# Patient Record
Sex: MALE | Race: WHITE | Employment: OTHER | URBAN - METROPOLITAN AREA
[De-identification: names, ages, dates, MRNs, and addresses within clinical notes are randomized per-mention and may not be internally consistent; named-entity substitution may affect disease eponyms.]

---

## 2018-06-12 PROBLEM — R39.12 WEAK URINE STREAM: Status: ACTIVE | Noted: 2018-06-12

## 2019-10-23 ENCOUNTER — ANESTHESIA EVENT (OUTPATIENT)
Dept: SURGERY | Age: 74
End: 2019-10-23
Payer: MEDICARE

## 2019-10-23 NOTE — PERIOP NOTES
PAT - SURGICAL PRE-ADMISSION INSTRUCTIONS    NAME:  Zander Buckley                                                          TODAY'S DATE:  10/23/2019    SURGERY DATE:  10/24/2019                                  SURGERY ARRIVAL TIME:   0700    1. Do NOT eat or drink anything, including candy or gum, after MIDNIGHT on 10/23 , unless you have specific instructions from your Surgeon or Anesthesia Provider to do so. 2. No smoking on the day of surgery. 3. No alcohol 24 hours prior to the day of surgery. 4. No recreational drugs for one week prior to the day of surgery. 5. Leave all valuables, including money/purse, at home. 6. Remove all jewelry, nail polish, makeup (including mascara); no lotions, powders, deodorant, or perfume/cologne/after shave. 7. Glasses/Contact lenses and Dentures may be worn to the hospital.  They will be removed prior to surgery. 8. Call your doctor if symptoms of a cold or illness develop within 24 ours prior to surgery. 9. AN ADULT MUST DRIVE YOU HOME AFTER OUTPATIENT SURGERY. 10. If you are having an OUTPATIENT procedure, please make arrangements for a responsible adult to be with you for 24 hours after your surgery. 11. If you are admitted to the hospital, you will be assigned to a bed after surgery is complete. Normally a family member will not be able to see you until you are in your assigned bed. 15. Family is encouraged to accompany you to the hospital.  We ask visitors in the treatment area to be limited to ONE person at a time to ensure patient privacy. EXCEPTIONS WILL BE MADE AS NEEDED. 15. Children under 12 are discouraged from entering the treatment area and need to be supervised by an adult when in the waiting room. Special Instructions: Take these medications the morning of surgery with a sip of water:  Metoprolol, HOLD oral diabetic medication on the MORNING OF surgery.     Patient Prep:    shower with anti-bacterial soap    These surgical instructions were reviewed with Elieser Elliott during the PAT phone call. Directions: On the morning of surgery, please go to the [facility]. Enter the building from the [facility] entrance, 1st floor (next to the Emergency Room entrance). Take the elevator to the 2nd floor. Sign in at the Registration Desk.     If you have any questions and/or concerns, please do not hesitate to call:  (Prior to the day of surgery)  [facility] unit:  [phone]  (Day of surgery)   unit:  [phone]

## 2019-10-24 ENCOUNTER — HOSPITAL ENCOUNTER (OUTPATIENT)
Age: 74
Setting detail: OBSERVATION
Discharge: HOME | End: 2019-10-25
Attending: UROLOGY | Admitting: UROLOGY
Payer: MEDICARE

## 2019-10-24 ENCOUNTER — ANESTHESIA (OUTPATIENT)
Dept: SURGERY | Age: 74
End: 2019-10-24
Payer: MEDICARE

## 2019-10-24 DIAGNOSIS — N52.1 ERECTILE DYSFUNCTION DUE TO DISEASES CLASSIFIED ELSEWHERE: Primary | ICD-10-CM

## 2019-10-24 LAB
BUN BLD-MCNC: 38 MG/DL (ref 7–18)
CHLORIDE BLD-SCNC: 101 MMOL/L (ref 100–108)
GLUCOSE BLD STRIP.AUTO-MCNC: 103 MG/DL (ref 70–110)
GLUCOSE BLD STRIP.AUTO-MCNC: 104 MG/DL (ref 70–110)
GLUCOSE BLD STRIP.AUTO-MCNC: 112 MG/DL (ref 70–110)
GLUCOSE BLD STRIP.AUTO-MCNC: 99 MG/DL (ref 74–106)
HCT VFR BLD CALC: 38 % (ref 36–49)
HGB BLD-MCNC: 12.9 G/DL (ref 12–16)
POTASSIUM BLD-SCNC: 5.4 MMOL/L (ref 3.5–5.5)
SODIUM BLD-SCNC: 139 MMOL/L (ref 136–145)

## 2019-10-24 PROCEDURE — 99218 HC RM OBSERVATION: CPT

## 2019-10-24 PROCEDURE — 74011250637 HC RX REV CODE- 250/637: Performed by: STUDENT IN AN ORGANIZED HEALTH CARE EDUCATION/TRAINING PROGRAM

## 2019-10-24 PROCEDURE — 88300 SURGICAL PATH GROSS: CPT

## 2019-10-24 PROCEDURE — 74011000272 HC RX REV CODE- 272: Performed by: UROLOGY

## 2019-10-24 PROCEDURE — 77030011265 HC ELECTRD BLD HEX COVD -A: Performed by: UROLOGY

## 2019-10-24 PROCEDURE — 77030012961 HC IRR KT CYSTO/TUR ICUM -A: Performed by: UROLOGY

## 2019-10-24 PROCEDURE — 74011250636 HC RX REV CODE- 250/636

## 2019-10-24 PROCEDURE — 74011000258 HC RX REV CODE- 258: Performed by: STUDENT IN AN ORGANIZED HEALTH CARE EDUCATION/TRAINING PROGRAM

## 2019-10-24 PROCEDURE — 87186 SC STD MICRODIL/AGAR DIL: CPT

## 2019-10-24 PROCEDURE — 77030002966 HC SUT PDS J&J -A: Performed by: UROLOGY

## 2019-10-24 PROCEDURE — 82962 GLUCOSE BLOOD TEST: CPT

## 2019-10-24 PROCEDURE — 74011250636 HC RX REV CODE- 250/636: Performed by: STUDENT IN AN ORGANIZED HEALTH CARE EDUCATION/TRAINING PROGRAM

## 2019-10-24 PROCEDURE — 77030008683 HC TU ET CUF COVD -A: Performed by: ANESTHESIOLOGY

## 2019-10-24 PROCEDURE — 74011250636 HC RX REV CODE- 250/636: Performed by: NURSE ANESTHETIST, CERTIFIED REGISTERED

## 2019-10-24 PROCEDURE — 88305 TISSUE EXAM BY PATHOLOGIST: CPT

## 2019-10-24 PROCEDURE — 77030018823 HC SLV COMPR VENO -B: Performed by: UROLOGY

## 2019-10-24 PROCEDURE — 77030013079 HC BLNKT BAIR HGGR 3M -A: Performed by: ANESTHESIOLOGY

## 2019-10-24 PROCEDURE — 87070 CULTURE OTHR SPECIMN AEROBIC: CPT

## 2019-10-24 PROCEDURE — 77030031139 HC SUT VCRL2 J&J -A: Performed by: UROLOGY

## 2019-10-24 PROCEDURE — 77030021678 HC GLIDESCP STAT DISP VERT -B: Performed by: ANESTHESIOLOGY

## 2019-10-24 PROCEDURE — 77030013782 HC KT PENIL INFL AMS -C: Performed by: UROLOGY

## 2019-10-24 PROCEDURE — 77030010507 HC ADH SKN DERMBND J&J -B: Performed by: UROLOGY

## 2019-10-24 PROCEDURE — 77030005537 HC CATH URETH BARD -A: Performed by: UROLOGY

## 2019-10-24 PROCEDURE — 74011250636 HC RX REV CODE- 250/636: Performed by: UROLOGY

## 2019-10-24 PROCEDURE — 74011000250 HC RX REV CODE- 250

## 2019-10-24 PROCEDURE — 76010000132 HC OR TIME 2.5 TO 3 HR: Performed by: UROLOGY

## 2019-10-24 PROCEDURE — 77030034850: Performed by: UROLOGY

## 2019-10-24 PROCEDURE — 77030018390 HC SPNG HEMSTAT2 J&J -B: Performed by: UROLOGY

## 2019-10-24 PROCEDURE — 87075 CULTR BACTERIA EXCEPT BLOOD: CPT

## 2019-10-24 PROCEDURE — 77030027138 HC INCENT SPIROMETER -A

## 2019-10-24 PROCEDURE — 74011250637 HC RX REV CODE- 250/637: Performed by: NURSE ANESTHETIST, CERTIFIED REGISTERED

## 2019-10-24 PROCEDURE — 77030020269 HC MISC IMPL: Performed by: UROLOGY

## 2019-10-24 PROCEDURE — 77030027714 HC DRN WND KT TLS STRY -B: Performed by: UROLOGY

## 2019-10-24 PROCEDURE — 87077 CULTURE AEROBIC IDENTIFY: CPT

## 2019-10-24 PROCEDURE — 82947 ASSAY GLUCOSE BLOOD QUANT: CPT

## 2019-10-24 PROCEDURE — 77030020263 HC SOL INJ SOD CL0.9% LFCR 1000ML: Performed by: UROLOGY

## 2019-10-24 PROCEDURE — C1813 PROSTHESIS, PENILE, INFLATAB: HCPCS | Performed by: UROLOGY

## 2019-10-24 PROCEDURE — 76060000036 HC ANESTHESIA 2.5 TO 3 HR: Performed by: UROLOGY

## 2019-10-24 PROCEDURE — 76210000000 HC OR PH I REC 2 TO 2.5 HR: Performed by: UROLOGY

## 2019-10-24 DEVICE — AMS 700 INFLATABLE PENILE PROSTHESIS, 1 SPHERICAL RESERVOIR, INHIBIZONE TREATED
Type: IMPLANTABLE DEVICE | Site: PENIS | Status: FUNCTIONAL
Brand: AMS 700 SPHERICAL RESERVOIR

## 2019-10-24 DEVICE — INFLATABLE PENILE PROSTHESIS, INHIBIZONE/PRECONNECTED - PENOSCROTAL 1 PUMP 2 CYLINDERS WITH 10 CM LONG PRECONNECT TUBING
Type: IMPLANTABLE DEVICE | Site: PENIS | Status: FUNCTIONAL
Brand: AMS 700 CX MS PUMP

## 2019-10-24 RX ORDER — SODIUM CHLORIDE 0.9 % (FLUSH) 0.9 %
5-40 SYRINGE (ML) INJECTION AS NEEDED
Status: DISCONTINUED | OUTPATIENT
Start: 2019-10-24 | End: 2019-10-25 | Stop reason: HOSPADM

## 2019-10-24 RX ORDER — HYDROMORPHONE HYDROCHLORIDE 1 MG/ML
INJECTION, SOLUTION INTRAMUSCULAR; INTRAVENOUS; SUBCUTANEOUS AS NEEDED
Status: DISCONTINUED | OUTPATIENT
Start: 2019-10-24 | End: 2019-10-24 | Stop reason: HOSPADM

## 2019-10-24 RX ORDER — HEPARIN SODIUM 5000 [USP'U]/ML
5000 INJECTION, SOLUTION INTRAVENOUS; SUBCUTANEOUS EVERY 8 HOURS
Status: DISCONTINUED | OUTPATIENT
Start: 2019-10-24 | End: 2019-10-25 | Stop reason: HOSPADM

## 2019-10-24 RX ORDER — SUCCINYLCHOLINE CHLORIDE 100 MG/5ML
SYRINGE (ML) INTRAVENOUS AS NEEDED
Status: DISCONTINUED | OUTPATIENT
Start: 2019-10-24 | End: 2019-10-24 | Stop reason: HOSPADM

## 2019-10-24 RX ORDER — EPHEDRINE SULFATE/0.9% NACL/PF 50 MG/5 ML
SYRINGE (ML) INTRAVENOUS AS NEEDED
Status: DISCONTINUED | OUTPATIENT
Start: 2019-10-24 | End: 2019-10-24 | Stop reason: HOSPADM

## 2019-10-24 RX ORDER — LIDOCAINE HYDROCHLORIDE 20 MG/ML
INJECTION, SOLUTION EPIDURAL; INFILTRATION; INTRACAUDAL; PERINEURAL AS NEEDED
Status: DISCONTINUED | OUTPATIENT
Start: 2019-10-24 | End: 2019-10-24 | Stop reason: HOSPADM

## 2019-10-24 RX ORDER — PROPOFOL 10 MG/ML
INJECTION, EMULSION INTRAVENOUS AS NEEDED
Status: DISCONTINUED | OUTPATIENT
Start: 2019-10-24 | End: 2019-10-24 | Stop reason: HOSPADM

## 2019-10-24 RX ORDER — ONDANSETRON 2 MG/ML
4 INJECTION INTRAMUSCULAR; INTRAVENOUS
Status: DISCONTINUED | OUTPATIENT
Start: 2019-10-24 | End: 2019-10-25 | Stop reason: HOSPADM

## 2019-10-24 RX ORDER — FAMOTIDINE 20 MG/1
20 TABLET, FILM COATED ORAL ONCE
Status: COMPLETED | OUTPATIENT
Start: 2019-10-24 | End: 2019-10-24

## 2019-10-24 RX ORDER — METOPROLOL SUCCINATE 25 MG/1
25 TABLET, EXTENDED RELEASE ORAL DAILY
Status: DISCONTINUED | OUTPATIENT
Start: 2019-10-25 | End: 2019-10-25 | Stop reason: HOSPADM

## 2019-10-24 RX ORDER — SODIUM CHLORIDE, SODIUM LACTATE, POTASSIUM CHLORIDE, CALCIUM CHLORIDE 600; 310; 30; 20 MG/100ML; MG/100ML; MG/100ML; MG/100ML
125 INJECTION, SOLUTION INTRAVENOUS CONTINUOUS
Status: DISCONTINUED | OUTPATIENT
Start: 2019-10-24 | End: 2019-10-24 | Stop reason: HOSPADM

## 2019-10-24 RX ORDER — SIMVASTATIN 20 MG/1
20 TABLET, FILM COATED ORAL
Status: DISCONTINUED | OUTPATIENT
Start: 2019-10-24 | End: 2019-10-25 | Stop reason: HOSPADM

## 2019-10-24 RX ORDER — HEPARIN SODIUM 5000 [USP'U]/ML
5000 INJECTION, SOLUTION INTRAVENOUS; SUBCUTANEOUS EVERY 8 HOURS
Status: DISCONTINUED | OUTPATIENT
Start: 2019-10-24 | End: 2019-10-24

## 2019-10-24 RX ORDER — OXYCODONE HYDROCHLORIDE 5 MG/1
5 TABLET ORAL
Status: DISCONTINUED | OUTPATIENT
Start: 2019-10-24 | End: 2019-10-25 | Stop reason: HOSPADM

## 2019-10-24 RX ORDER — ALFUZOSIN HYDROCHLORIDE 10 MG/1
10 TABLET, EXTENDED RELEASE ORAL DAILY
Status: DISCONTINUED | OUTPATIENT
Start: 2019-10-25 | End: 2019-10-25 | Stop reason: HOSPADM

## 2019-10-24 RX ORDER — ONDANSETRON 2 MG/ML
4 INJECTION INTRAMUSCULAR; INTRAVENOUS ONCE
Status: DISCONTINUED | OUTPATIENT
Start: 2019-10-24 | End: 2019-10-24 | Stop reason: HOSPADM

## 2019-10-24 RX ORDER — FLUCONAZOLE 2 MG/ML
200 INJECTION, SOLUTION INTRAVENOUS ONCE
Status: COMPLETED | OUTPATIENT
Start: 2019-10-24 | End: 2019-10-24

## 2019-10-24 RX ORDER — SULFAMETHOXAZOLE AND TRIMETHOPRIM 800; 160 MG/1; MG/1
1 TABLET ORAL 2 TIMES DAILY
Qty: 28 TAB | Refills: 0 | Status: SHIPPED | OUTPATIENT
Start: 2019-10-24 | End: 2019-11-04 | Stop reason: ALTCHOICE

## 2019-10-24 RX ORDER — INSULIN LISPRO 100 [IU]/ML
INJECTION, SOLUTION INTRAVENOUS; SUBCUTANEOUS ONCE
Status: DISCONTINUED | OUTPATIENT
Start: 2019-10-24 | End: 2019-10-24 | Stop reason: HOSPADM

## 2019-10-24 RX ORDER — PANTOPRAZOLE SODIUM 40 MG/1
40 TABLET, DELAYED RELEASE ORAL 2 TIMES DAILY
Status: DISCONTINUED | OUTPATIENT
Start: 2019-10-24 | End: 2019-10-25 | Stop reason: HOSPADM

## 2019-10-24 RX ORDER — MAGNESIUM SULFATE 100 %
4 CRYSTALS MISCELLANEOUS AS NEEDED
Status: DISCONTINUED | OUTPATIENT
Start: 2019-10-24 | End: 2019-10-24 | Stop reason: HOSPADM

## 2019-10-24 RX ORDER — SODIUM CHLORIDE, SODIUM LACTATE, POTASSIUM CHLORIDE, CALCIUM CHLORIDE 600; 310; 30; 20 MG/100ML; MG/100ML; MG/100ML; MG/100ML
50 INJECTION, SOLUTION INTRAVENOUS CONTINUOUS
Status: DISCONTINUED | OUTPATIENT
Start: 2019-10-24 | End: 2019-10-24 | Stop reason: HOSPADM

## 2019-10-24 RX ORDER — INSULIN LISPRO 100 [IU]/ML
INJECTION, SOLUTION INTRAVENOUS; SUBCUTANEOUS
Status: DISCONTINUED | OUTPATIENT
Start: 2019-10-24 | End: 2019-10-25 | Stop reason: HOSPADM

## 2019-10-24 RX ORDER — ONDANSETRON 2 MG/ML
INJECTION INTRAMUSCULAR; INTRAVENOUS AS NEEDED
Status: DISCONTINUED | OUTPATIENT
Start: 2019-10-24 | End: 2019-10-24 | Stop reason: HOSPADM

## 2019-10-24 RX ORDER — SODIUM CHLORIDE 0.9 % (FLUSH) 0.9 %
5-40 SYRINGE (ML) INJECTION EVERY 8 HOURS
Status: DISCONTINUED | OUTPATIENT
Start: 2019-10-24 | End: 2019-10-25 | Stop reason: HOSPADM

## 2019-10-24 RX ORDER — NALOXONE HYDROCHLORIDE 0.4 MG/ML
0.4 INJECTION, SOLUTION INTRAMUSCULAR; INTRAVENOUS; SUBCUTANEOUS AS NEEDED
Status: DISCONTINUED | OUTPATIENT
Start: 2019-10-24 | End: 2019-10-25 | Stop reason: HOSPADM

## 2019-10-24 RX ORDER — FENTANYL CITRATE 50 UG/ML
INJECTION, SOLUTION INTRAMUSCULAR; INTRAVENOUS AS NEEDED
Status: DISCONTINUED | OUTPATIENT
Start: 2019-10-24 | End: 2019-10-24 | Stop reason: HOSPADM

## 2019-10-24 RX ORDER — MAGNESIUM SULFATE 100 %
4 CRYSTALS MISCELLANEOUS AS NEEDED
Status: DISCONTINUED | OUTPATIENT
Start: 2019-10-24 | End: 2019-10-25 | Stop reason: HOSPADM

## 2019-10-24 RX ORDER — FENTANYL CITRATE 50 UG/ML
INJECTION, SOLUTION INTRAMUSCULAR; INTRAVENOUS
Status: COMPLETED
Start: 2019-10-24 | End: 2019-10-24

## 2019-10-24 RX ORDER — FLUCONAZOLE 2 MG/ML
200 INJECTION, SOLUTION INTRAVENOUS EVERY 24 HOURS
Status: COMPLETED | OUTPATIENT
Start: 2019-10-25 | End: 2019-10-25

## 2019-10-24 RX ORDER — OXYCODONE HYDROCHLORIDE 5 MG/1
5 CAPSULE ORAL
Qty: 10 CAP | Refills: 0 | Status: SHIPPED | OUTPATIENT
Start: 2019-10-24 | End: 2019-10-27

## 2019-10-24 RX ORDER — HYDROMORPHONE HYDROCHLORIDE 1 MG/ML
0.2 INJECTION, SOLUTION INTRAMUSCULAR; INTRAVENOUS; SUBCUTANEOUS
Status: DISCONTINUED | OUTPATIENT
Start: 2019-10-24 | End: 2019-10-25 | Stop reason: HOSPADM

## 2019-10-24 RX ORDER — LIDOCAINE HYDROCHLORIDE 10 MG/ML
0.1 INJECTION, SOLUTION EPIDURAL; INFILTRATION; INTRACAUDAL; PERINEURAL AS NEEDED
Status: DISCONTINUED | OUTPATIENT
Start: 2019-10-24 | End: 2019-10-24 | Stop reason: HOSPADM

## 2019-10-24 RX ORDER — FENTANYL CITRATE 50 UG/ML
50 INJECTION, SOLUTION INTRAMUSCULAR; INTRAVENOUS AS NEEDED
Status: DISCONTINUED | OUTPATIENT
Start: 2019-10-24 | End: 2019-10-24 | Stop reason: HOSPADM

## 2019-10-24 RX ORDER — ACETAMINOPHEN 325 MG/1
650 TABLET ORAL
Status: DISCONTINUED | OUTPATIENT
Start: 2019-10-24 | End: 2019-10-25 | Stop reason: HOSPADM

## 2019-10-24 RX ADMIN — SODIUM CHLORIDE 1000 MG: 900 INJECTION, SOLUTION INTRAVENOUS at 20:38

## 2019-10-24 RX ADMIN — Medication 10 MG: at 09:38

## 2019-10-24 RX ADMIN — HYDROMORPHONE HYDROCHLORIDE 1 MG: 1 INJECTION, SOLUTION INTRAMUSCULAR; INTRAVENOUS; SUBCUTANEOUS at 10:40

## 2019-10-24 RX ADMIN — SODIUM CHLORIDE, SODIUM LACTATE, POTASSIUM CHLORIDE, AND CALCIUM CHLORIDE: 600; 310; 30; 20 INJECTION, SOLUTION INTRAVENOUS at 09:00

## 2019-10-24 RX ADMIN — FENTANYL CITRATE 100 MCG: 50 INJECTION, SOLUTION INTRAMUSCULAR; INTRAVENOUS at 09:16

## 2019-10-24 RX ADMIN — HEPARIN SODIUM 5000 UNITS: 5000 INJECTION INTRAVENOUS; SUBCUTANEOUS at 23:05

## 2019-10-24 RX ADMIN — HEPARIN SODIUM 5000 UNITS: 5000 INJECTION INTRAVENOUS; SUBCUTANEOUS at 18:00

## 2019-10-24 RX ADMIN — SIMVASTATIN 20 MG: 20 TABLET, FILM COATED ORAL at 23:05

## 2019-10-24 RX ADMIN — ONDANSETRON 4 MG: 2 SOLUTION INTRAMUSCULAR; INTRAVENOUS at 11:36

## 2019-10-24 RX ADMIN — PANTOPRAZOLE SODIUM 40 MG: 40 TABLET, DELAYED RELEASE ORAL at 18:23

## 2019-10-24 RX ADMIN — Medication 10 MG: at 09:56

## 2019-10-24 RX ADMIN — SODIUM CHLORIDE 1000 MG: 900 INJECTION, SOLUTION INTRAVENOUS at 09:10

## 2019-10-24 RX ADMIN — Medication 100 MG: at 09:17

## 2019-10-24 RX ADMIN — TOBRAMYCIN SULFATE 330 MG: 40 INJECTION, SOLUTION INTRAMUSCULAR; INTRAVENOUS at 10:24

## 2019-10-24 RX ADMIN — FAMOTIDINE 20 MG: 20 TABLET ORAL at 09:17

## 2019-10-24 RX ADMIN — Medication 10 MG: at 09:35

## 2019-10-24 RX ADMIN — PROPOFOL 160 MG: 10 INJECTION, EMULSION INTRAVENOUS at 09:16

## 2019-10-24 RX ADMIN — FENTANYL CITRATE 50 MCG: 50 INJECTION, SOLUTION INTRAMUSCULAR; INTRAVENOUS at 12:25

## 2019-10-24 RX ADMIN — FLUCONAZOLE 200 MG: 2 INJECTION, SOLUTION INTRAVENOUS at 10:54

## 2019-10-24 RX ADMIN — Medication 10 MG: at 10:13

## 2019-10-24 RX ADMIN — LIDOCAINE HYDROCHLORIDE 50 MG: 20 INJECTION, SOLUTION INTRAVENOUS at 09:16

## 2019-10-24 RX ADMIN — Medication 10 ML: at 18:31

## 2019-10-24 RX ADMIN — FENTANYL CITRATE 50 MCG: 50 INJECTION INTRAMUSCULAR; INTRAVENOUS at 12:25

## 2019-10-24 NOTE — PROGRESS NOTES
1454  Received pt from PACU, alert and oriented, penis wrapped, soni intact, 2 TLS intact with small bleeding around site, soni taped well, no pain at this time, Teller, wearing hearing aid bilateral, bell with in reach. 1700  Resting, offered pain med but he denies pain at this time, no bleeding at penis, urine draining clear. 1900   No pain at this time. 2000  Resting, no blood in the soni, offered pain med, does not want at this time, triflo raised up to 1000 ml.    2100 resting , report to [facility].

## 2019-10-24 NOTE — PERIOP NOTES
Pre-Op Summary    Pt arrived via car with family/friend and is oriented to time, place, person and situation. Patient with steady gait with none assistive devices. Visit Vitals  Ht 5' 7\" (1.702 m)   Wt 72.6 kg (160 lb)   BMI 25.06 kg/m²       Peripheral IV located on Left antecubital .    Patients belongings are located with patient. They will be admitted.

## 2019-10-24 NOTE — ANESTHESIA PREPROCEDURE EVALUATION
Relevant Problems   No relevant active problems       Anesthetic History   No history of anesthetic complications            Review of Systems / Medical History  Patient summary reviewed, nursing notes reviewed and pertinent labs reviewed    Pulmonary  Within defined limits                 Neuro/Psych   Within defined limits           Cardiovascular    Hypertension                   GI/Hepatic/Renal     GERD      Liver disease     Endo/Other    Diabetes    Morbid obesity     Other Findings              Physical Exam    Airway  Mallampati: III  TM Distance: 4 - 6 cm  Neck ROM: normal range of motion   Mouth opening: Normal     Cardiovascular  Regular rate and rhythm,  S1 and S2 normal,  no murmur, click, rub, or gallop             Dental  No notable dental hx       Pulmonary  Breath sounds clear to auscultation               Abdominal  GI exam deferred       Other Findings            Anesthetic Plan    ASA: 3  Anesthesia type: general          Induction: Intravenous  Anesthetic plan and risks discussed with: Patient

## 2019-10-24 NOTE — PROGRESS NOTES
Pharmacy Monitoring: Antimicrobials    Day #1 of ABX therapy    Indication:  Surgical ppx    Current regimen:    - vancomycin 1000 mg IV every 12 hours x 24 hours  - tobramycin 330 mg IV every 24 hours x 2 doses    No results for input(s): WBC, CREA, BUN in the last 72 hours. Est CrCl: - ml/min    Temp (24hrs), Av °F (36.7 °C), Min:97.6 °F (36.4 °C), Max:98.4 °F (36.9 °C)    Cultures: pending    Plan: Continue current regimen - labs on order    Please call pharmacy for questions.     Thanks,  Alejandra Damico, PHARMD

## 2019-10-24 NOTE — H&P
Sha Francisco MD   Physician   Urology   Progress Notes   Signed   Encounter Date:  10/7/2019                    []Hide copied text    []Jaydon for details       Assessment:       Encounter Diagnoses   Name Primary?  Erectile dysfunction due to diseases classified elsewhere Yes    Slowing of urinary stream      Incomplete bladder emptying      Bladder atony        Plan: Will reschedule for explant of IPP and implant of  IPP at [facility]. Preoperative urine culture sent.      Patient's BMI is out of the normal parameters. Information about BMI was given and patient was advised to follow-up with their PCP for further management.     CONSENT FOR REPLACEMENT OF INFLATABLE PENILE PROSTHESIS, CYSTOSCOPY     The risks and benefits  were discussed at length with the patient. Risks of the procedure include, but are not limited to, bleeding, wound infection, and injury to surrounding structures during the procedure. If the urethra is injured during implantation of the device, the procedure may have to be aborted before completing device placement to allow the injury to heal.  Wound infection is one the most concerning complications as an infection around the prosthesis would likely require removal of the prosthesis to allow the infection to completely clear up, resulting in ongoing inability to have erections. Some, but not all, of the available prosthetic devices have an antibiotic coating which can reduce the risk of infection around the device. We discussed that the prosthesis is a mechanical device and will eventually wear out. If this occurs and the patient desires to continue with sexual activity, the device can be replaced but this will require a repeat trip to the operating room.   The patient expresses an understanding of these risks, benefits, had all questions answered and requests that we proceed as planned with replacement of an inflatable penile prosthesis in the [facility].    Chief Complaint   Patient presents with    Post OP Follow Up       here for surgery dates       History of present Illness:    Cristofer Benson is a 76 y.o. male who presents today to schedule surgery and for pre op H&P.     Here today to schedule explant of IPP and implant of  IPP. Denies flank pain, gross hematuria, dysuria and is asymptomatic for infection today. No f/c/n/v.   He desires to proceed with surgery.     PRIOR HISTORY:  Initially had  placement 8/14/2009 by Dr. Vivien Villarreal, now malfunctioning. 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 Pt was scheduled for explant of  and implant however on 9/13/19 however it was cancelled as patient was +infection (30,000 ORG/ML Candida glabrata (A)).       Last PSA: 0.026 on 4/18/17.      Review of Systems  Constitutional: Fever: No  Skin: Rash: No  HEENT: Hearing difficulty: No  Eyes: Blurred vision: No  Cardiovascular: Chest pain: No  Respiratory: Shortness of breath: No  Gastrointestinal: Nausea/vomiting: No  Musculoskeletal: Back pain: No  Neurological: Weakness: No  Psychological: Memory loss: No  Comments/additional findings:           Past Medical History:   Diagnosis Date    Bladder atony      CKD (chronic kidney disease), stage III (HCC)      Colon polyps      Diabetes (HCC)      Diabetic neuropathy (HCC)      Erectile dysfunction      Flank pain      GERD (gastroesophageal reflux disease)      Hemorrhoid      History of penile implant      [place]ult (hard of hearing)      Hypercholesteremia      Hypertension      Impotence of organic origin      Kidney stones       hist of     Lumbar disc disease      Obesity      Plantar fasciitis      Serum cholesterol elevated      Weak urinary stream              Past Surgical History:   Procedure Laterality Date    HX HEMORRHOIDECTOMY   2013    HX LUMBAR DISKECTOMY   1978    HX OTHER SURGICAL         global antonio.    HX SHOULDER ARTHROSCOPY   2015    HX VASECTOMY          Social History            Socioeconomic History    Marital status:        Spouse name: Not on file    Number of children: Not on file    Years of education: Not on file    Highest education level: Not on file   Occupational History    Not on file   Social Needs    Financial resource strain: Not on file    Food insecurity:       Worry: Not on file       Inability: Not on file    Transportation needs:       Medical: Not on file       Non-medical: Not on file   Tobacco Use    Smoking status: Former Smoker       Packs/day: 2.50       Years: 23.00       Pack years: 57.50       Types: Cigarettes       Last attempt to quit:        Years since quittin.7    Smokeless tobacco: Former User   Substance and Sexual Activity    Alcohol use: No    Drug use: No    Sexual activity: Not on file   Lifestyle    Physical activity:       Days per week: Not on file       Minutes per session: Not on file    Stress: Not on file   Relationships    Social connections:       Talks on phone: Not on file       Gets together: Not on file       Attends Restoration service: Not on file       Active member of club or organization: Not on file       Attends meetings of clubs or organizations: Not on file       Relationship status: Not on file    Intimate partner violence:       Fear of current or ex partner: Not on file       Emotionally abused: Not on file       Physically abused: Not on file       Forced sexual activity: Not on file   Other Topics Concern    Not on file   Social History Narrative    Not on file            Family History   Problem Relation Age of Onset    Diabetes Mother               Current Outpatient Medications on File Prior to Visit   Medication Sig Dispense Refill    glucose blood VI test strips (ASCENSIA AUTODISC VI, ONE TOUCH ULTRA TEST VI) strip 50 Each.        calcium-cholecalciferol, D3, (CALTRATE 600+D) tablet Take 1 Tab by mouth.        exenatide microspheres 2 mg/0.85 mL atIn          insulin glargine U-300 conc 300 unit/mL (3 mL) inpn 100 Units by SubCUTAneous route.        metoprolol tartrate (LOPRESSOR) 25 mg tablet Take 25 mg by mouth.        MISAEL PEN NEEDLE 32 gauge x 5/32\" ndle          vit C,J-Qe-jufmn-lutein-zeaxan (PRESERVISION AREDS-2) 142-043-84-1 mg-unit-mg-mg cap capsule Take  by mouth.        pioglitazone (ACTOS) 30 mg tablet          pantoprazole (PROTONIX) 40 mg tablet          dulaglutide (TRULICITY SC) by SubCUTAneous route.        acarbose (PRECOSE) 50 mg tablet Take  by mouth.        metoprolol succinate (TOPROL XL) 25 mg XL tablet Take  by mouth daily.        simvastatin (ZOCOR) 20 mg tablet Take  by mouth nightly.          No current facility-administered medications on file prior to visit.             Allergies   Allergen Reactions    Lisinopril Itching    Vicodin [Hydrocodone-Acetaminophen] Other (comments)       Unknown effect         Physical exam:       Visit Vitals  /70   Ht 5' 6\" (1.676 m)   Wt 167 lb (75.8 kg)   BMI 26.95 kg/m²      Constitutional: WDWN, pleasant and appropriate affect, no acute distress. CV:  No peripheral swelling noted. Respiratory: No respiratory distress or difficulties. Skin: Normal color and texture and No rashes or erythema noted  Neuro/Psych:  Alert and Oriented x3, affect appropriate.  Male 6/26/19: NEMG              IPP not cycling      Review of Labs and Imaging:   Bladder scan today w/o voiding 4cc     UDS 10/24/18  Comments: IPP placement CJ 5104, today he complains of weak FOS and nocturia (unable to verify since voiding diary was not completed).   UDS completed while patient is taking Uroxatral 10 mg daily with some improvement.  Per [facility] patient has a small capacity compliant bladder with delayed sensations.  No DO noted.  Patient was not able to generate a pressure detrusor contraction.  He voided solely by valsalva and after straining he seems very tired. when I asked him about it he said it takes a lot of energy to void. [facility] had low intermittent flow and active EUS but was not able to empty.  PVR of 320 via catheter.       CC: Emerick Bosworth, MD Laqueta Havers, MD  THE [facility]  A Division of Urology of [place] [address] Documentation is provided with the assistance of Belem Vidal, medical scribe for Keri Mcguire MD on 10/7/2019.         Electronically signed by Jose Daniel Lamas MD at 10/10/19 6025     Date of Surgery Update:  Pricilla Ferraro was seen and examined. History and physical has been reviewed. The patient has been examined.  There have been no significant clinical changes since the completion of the originally dated History and Physical.    Signed By: Keri Mcguire MD     October 24, 2019 9:04 AM

## 2019-10-24 NOTE — OP NOTES
Operative Note    10/24/2019    Patient: Shanon Cueva               Sex: male             MRN: [id number]      YOB: 1945      Age:  76 y.o. Preoperative Diagnosis: Erectile dysfunction. Postoperative Diagnosis:  Erectile dysfunction. Surgeon: Mary Irving) and Role:     * Terrance Cárdenas MD - Primary    Assistant:   Hoa Newell. López Mackay. Staff:   Circ-1: Dea Barber RN  Scrub Tech-1: Diamond HAYNES  Scrub Tech-Relief: Dora Manning    Anesthesia:  General  Anesthesiologist: Gaetano Emanuel DO  CRNA: Henry Stahl CRNA    Indications for surgery: Mr. Shanon Cueva is a 76 y.o. male with erectile dysfunction. He has failed conservative treatment for ED and now elects for penile prosthesis placement after discussion of the risks, and benefits of the procedure. Procedure performed:     1. Complete explant of  CX three-piece inflatable penile prosthesis (combined suprapubic and penoscrotal approach)    2. Placement of  CX three-piece inflatable penile prosthesis (penoscrotal approach)    Findings:     1.  CX 3-piece penile prosthesis explant/placement without complications. 2. We used a 18-cm device with a 2-cm rear-tip extender on the right and 2-cm rear-tip extender on the left. 3. A 65 mL reservoir was utilized in the space of Retzius through the left inguinal ring. 4. The scrotal pump was placed in the anterior aspect of scrotum. Procedure in Detail:   The patient was brought back to the operating room and placed on the table in the supine position. He had bilateral sequential compression devices placed and received antibiotic preoperatively for antibiotic prophylaxis (Vancomycin, Tobramycin, Diflucan). He was then placed under general endotracheal anesthesia and then prepped and draped in the usual sterile fashion. We did perform a standard 10-minute chlorhexidine and alcohol prep.  A surgical time-out was performed identifying the correct patient and procedure and all were in agreement. We started with right suprapubic incision, using old scar. The previous tubing was found and we first removed the reservoir intact; clear fluid was observed at reservoir cavity and this was cultured. Next followed dissection towards entrance of tubing into corpora cavernosa, where the tubing was cut and the corporectomy defects closed with 3/0 PDS. A penoscrotal incision was made. Dissection was carried down through the subcutaneous tissues with Bovie cautery. The pump was identified and this was removed entirely, including previous capsule that was sent to pathology. Next the corpora cavernosa were identified bilaterally and cleared proximally down downwards the crura. The Roly retractor was then placed and used for retraction. We used a 2-0 Prolene stay sutures and then made a 2-cm corporotomy bilaterally. We then removed previous cylinders entirely. Satisfied with all the components removed, we proceed to perform Mulcahey washout in all cavities: corpora, scrotum and abdomen. We then measured the corpora using the standard sizer. With each corpora measured, we then chose the appropriate prosthesis with above the mentioned dimensions. The prosthesis opened at the back table and prepared in the standard fashion to evacuate any air bubbles. This was then passed to the operative field. We used the JoGuru with straight needle to place this out through the mid glands bilaterally. The balloons were then seated proximally and distally in the corporal bodies without buckling. We cycled the device using a syringe and this demonstrated a good cosmetic result. The corporotomies were then closed with the preplaced PDS sutures. At this point, we created a pocket in the space of Retzius for the reservoir placement. Gentle blunt dissection was utilized to [facility] the transversalis fascia through the left external ring.  There was adequate space and then the reservoir was placed within it. We instilled 60 mL of injectable saline into the reservoir. A subdartos pocket was created in the scrotum and the pump was placed within it. At this point, the tubing was trimmed to the appropriate size and the quick connect devices were used to make the connection. The tubing was then buried deep in the scrotum with several layers of dartos fascia approximated with 3-0 Vycril suture. We then closed the skin with a running 4-0 Monocryl. The patient was cleaned and dried. Dermabond was applied to the incision. A Kerlix mummy wrap was placed. A Dorsey catheter was placed to gravity drainage. Urine was clear. At this point the case was ended. The patient was awoken from general anesthesia and transferred to the PACU in stable condition. He will be recovered in the PACU and then admitted for overnight observation. Estimated Blood Loss: 10 ml             Implants:   Implant Name Type Inv. Item Serial No.  Lot No. LRB No. Used Action   rte 2.0 cm    IPICO [id number] N/A 1 Implanted   RESERVIOR PENILE PROS IZ 65 --  - [id number]  RESERVIOR PENILE PROS IZ 65 --   [facility] [id number] N/A 1 Implanted   ams 700 cx ms pump    IPICO [id number] N/A 1 Implanted     Specimens:   ID Type Source Tests Collected by Time Destination   1 : PUMP CAPSULE FROM PENILE IMPLANT Preservative Penis  Ziyad Liu MD 10/24/2019 10:58 AM Pathology   2 : PENILE PROSTHESIS EXPLANT FOR DOCUMENTATION  Penis  Ziyad Liu MD 10/24/2019 11:51 AM Pathology        Drains: A 14-[place] Dorsey catheter and TLS 10 Fr X 2: at scrotum and abdomen. Complications:  None           Counts: Sponge and needle counts were correct times two.     Plan: Mr. Kristy Forbes has an appointment to see me in one week  for early postop follow-up, at [address] [place] [address]. Ruth Jim [facility], MD  10/24/2019

## 2019-10-24 NOTE — PERIOP NOTES
TRANSFER - OUT REPORT:    Verbal report given to abida chen(name) on Aniyah Stringer  being transferred to 2201(unit) for routine post - op       Report consisted of patients Situation, Background, Assessment and   Recommendations(SBAR). Information from the following report(s) SBAR, OR Summary, Procedure Summary and Intake/Output was reviewed with the receiving nurse. Lines:   Peripheral IV 10/24/19 Left Antecubital (Active)   Site Assessment Clean, dry, & intact 10/24/2019 12:14 PM   Phlebitis Assessment 0 10/24/2019 12:14 PM   Infiltration Assessment 0 10/24/2019 12:14 PM   Dressing Status Clean, dry, & intact 10/24/2019 12:14 PM   Dressing Type Transparent;Tape 10/24/2019 12:14 PM   Hub Color/Line Status Infusing;Blue 10/24/2019 12:14 PM   Action Taken Open ports on tubing capped 10/24/2019  8:45 AM   Alcohol Cap Used Yes 10/24/2019  8:45 AM        Opportunity for questions and clarification was provided.       Patient transported with:   [employer]

## 2019-10-24 NOTE — ANESTHESIA POSTPROCEDURE EVALUATION
Procedure(s):  explantation and implantation  of Cx Inflatable  PENILE PROSTHESIS.    general    Anesthesia Post Evaluation      Multimodal analgesia: multimodal analgesia used between 6 hours prior to anesthesia start to PACU discharge  Patient location during evaluation: bedside  Patient participation: complete - patient participated  Level of consciousness: awake and alert  Pain management: adequate  Airway patency: patent  Anesthetic complications: no  Cardiovascular status: hemodynamically stable  Respiratory status: acceptable and spontaneous ventilation  Hydration status: acceptable  Post anesthesia nausea and vomiting:  none      Vitals Value Taken Time   /54 10/24/2019 12:54 PM   Temp 36.9 °C (98.4 °F) 10/24/2019 12:04 PM   Pulse 69 10/24/2019  1:27 PM   Resp 15 10/24/2019  1:27 PM   SpO2 95 % 10/24/2019  1:27 PM   Vitals shown include unvalidated device data.

## 2019-10-25 VITALS
HEART RATE: 78 BPM | SYSTOLIC BLOOD PRESSURE: 118 MMHG | DIASTOLIC BLOOD PRESSURE: 67 MMHG | RESPIRATION RATE: 16 BRPM | TEMPERATURE: 98.4 F | OXYGEN SATURATION: 97 % | HEIGHT: 67 IN | BODY MASS INDEX: 26.76 KG/M2 | WEIGHT: 170.5 LBS

## 2019-10-25 LAB
ANION GAP SERPL CALC-SCNC: 9 MMOL/L (ref 3–18)
BUN SERPL-MCNC: 18 MG/DL (ref 7–18)
BUN/CREAT SERPL: 16 (ref 12–20)
CALCIUM SERPL-MCNC: 8.4 MG/DL (ref 8.5–10.1)
CHLORIDE SERPL-SCNC: 104 MMOL/L (ref 100–111)
CO2 SERPL-SCNC: 28 MMOL/L (ref 21–32)
CREAT SERPL-MCNC: 1.12 MG/DL (ref 0.6–1.3)
ERYTHROCYTE [DISTWIDTH] IN BLOOD BY AUTOMATED COUNT: 14 % (ref 11.6–14.5)
EST. AVERAGE GLUCOSE BLD GHB EST-MCNC: 151 MG/DL
GLUCOSE BLD STRIP.AUTO-MCNC: 126 MG/DL (ref 70–110)
GLUCOSE BLD STRIP.AUTO-MCNC: 176 MG/DL (ref 70–110)
GLUCOSE SERPL-MCNC: 122 MG/DL (ref 74–99)
HBA1C MFR BLD: 6.9 % (ref 4.2–5.6)
HCT VFR BLD AUTO: 38.2 % (ref 36–48)
HGB BLD-MCNC: 12.9 G/DL (ref 13–16)
MCH RBC QN AUTO: 32.2 PG (ref 24–34)
MCHC RBC AUTO-ENTMCNC: 33.8 G/DL (ref 31–37)
MCV RBC AUTO: 95.3 FL (ref 74–97)
PLATELET # BLD AUTO: 158 K/UL (ref 135–420)
PMV BLD AUTO: 10.8 FL (ref 9.2–11.8)
POTASSIUM SERPL-SCNC: 3.8 MMOL/L (ref 3.5–5.5)
RBC # BLD AUTO: 4.01 M/UL (ref 4.7–5.5)
SODIUM SERPL-SCNC: 141 MMOL/L (ref 136–145)
WBC # BLD AUTO: 9.8 K/UL (ref 4.6–13.2)

## 2019-10-25 PROCEDURE — 74011250637 HC RX REV CODE- 250/637: Performed by: STUDENT IN AN ORGANIZED HEALTH CARE EDUCATION/TRAINING PROGRAM

## 2019-10-25 PROCEDURE — 85027 COMPLETE CBC AUTOMATED: CPT

## 2019-10-25 PROCEDURE — 82962 GLUCOSE BLOOD TEST: CPT

## 2019-10-25 PROCEDURE — 51798 US URINE CAPACITY MEASURE: CPT

## 2019-10-25 PROCEDURE — 74011250636 HC RX REV CODE- 250/636: Performed by: STUDENT IN AN ORGANIZED HEALTH CARE EDUCATION/TRAINING PROGRAM

## 2019-10-25 PROCEDURE — 80048 BASIC METABOLIC PNL TOTAL CA: CPT

## 2019-10-25 PROCEDURE — 74011000258 HC RX REV CODE- 258: Performed by: UROLOGY

## 2019-10-25 PROCEDURE — 36415 COLL VENOUS BLD VENIPUNCTURE: CPT

## 2019-10-25 PROCEDURE — 99218 HC RM OBSERVATION: CPT

## 2019-10-25 PROCEDURE — 83036 HEMOGLOBIN GLYCOSYLATED A1C: CPT

## 2019-10-25 PROCEDURE — 74011250636 HC RX REV CODE- 250/636: Performed by: UROLOGY

## 2019-10-25 RX ADMIN — FLUCONAZOLE IN SODIUM CHLORIDE 200 MG: 2 INJECTION, SOLUTION INTRAVENOUS at 11:00

## 2019-10-25 RX ADMIN — Medication 10 ML: at 18:49

## 2019-10-25 RX ADMIN — SODIUM CHLORIDE 1000 MG: 900 INJECTION, SOLUTION INTRAVENOUS at 09:06

## 2019-10-25 RX ADMIN — TOBRAMYCIN SULFATE 330 MG: 40 INJECTION, SOLUTION INTRAMUSCULAR; INTRAVENOUS at 10:21

## 2019-10-25 RX ADMIN — OXYCODONE HYDROCHLORIDE 5 MG: 5 TABLET ORAL at 09:00

## 2019-10-25 RX ADMIN — HEPARIN SODIUM 5000 UNITS: 5000 INJECTION INTRAVENOUS; SUBCUTANEOUS at 05:07

## 2019-10-25 RX ADMIN — HEPARIN SODIUM 5000 UNITS: 5000 INJECTION INTRAVENOUS; SUBCUTANEOUS at 13:19

## 2019-10-25 RX ADMIN — ALFUZOSIN HYDROCHLORIDE 10 MG: 10 TABLET ORAL at 08:31

## 2019-10-25 RX ADMIN — METOPROLOL SUCCINATE 25 MG: 25 TABLET, EXTENDED RELEASE ORAL at 08:31

## 2019-10-25 RX ADMIN — OXYCODONE HYDROCHLORIDE 5 MG: 5 TABLET ORAL at 13:19

## 2019-10-25 RX ADMIN — Medication 10 ML: at 05:07

## 2019-10-25 RX ADMIN — PANTOPRAZOLE SODIUM 40 MG: 40 TABLET, DELAYED RELEASE ORAL at 18:48

## 2019-10-25 RX ADMIN — PANTOPRAZOLE SODIUM 40 MG: 40 TABLET, DELAYED RELEASE ORAL at 09:46

## 2019-10-25 NOTE — DISCHARGE INSTRUCTIONS
DISCHARGE SUMMARY from Nurse    PATIENT INSTRUCTIONS:    After general anesthesia or intravenous sedation, for 24 hours or while taking prescription Narcotics:  · Limit your activities  · Do not drive and operate hazardous machinery  · Do not make important personal or business decisions  · Do  not drink alcoholic beverages  · If you have not urinated within 8 hours after discharge, please contact your surgeon on call. Report the following to your surgeon:  · Excessive pain, swelling, redness or odor of or around the surgical area  · Temperature over 100.5  · Nausea and vomiting lasting longer than 4 hours or if unable to take medications  · Any signs of decreased circulation or nerve impairment to extremity: change in color, persistent  numbness, tingling, coldness or increase pain  · Any questions    What to do at Home:  Recommended activity: Activity as tolerated and no driving for today and Ambulate in house,     If you experience any of the following symptoms like increasing pain  , please follow up with Nakita  . *  Please give a list of your current medications to your Primary Care Provider. *  Please update this list whenever your medications are discontinued, doses are      changed, or new medications (including over-the-counter products) are added. *  Please carry medication information at all times in case of emergency situations. These are general instructions for a healthy lifestyle:    No smoking/ No tobacco products/ Avoid exposure to second hand smoke  Surgeon General's Warning:  Quitting smoking now greatly reduces serious risk to your health.     Obesity, smoking, and sedentary lifestyle greatly increases your risk for illness    A healthy diet, regular physical exercise & weight monitoring are important for maintaining a healthy lifestyle    You may be retaining fluid if you have a history of heart failure or if you experience any of the following symptoms:  Weight gain of 3 pounds or more overnight or 5 pounds in a week, increased swelling in our hands or feet or shortness of breath while lying flat in bed. Please call your doctor as soon as you notice any of these symptoms; do not wait until your next office visit. Patient armband removed and shredded    MyChart Activation    Thank you for requesting access to [facility]. Please follow the instructions below to securely access and download your online medical record. [facility] allows you to send messages to your doctor, view your test results, renew your prescriptions, schedule appointments, and more. How Do I Sign Up? 1. In your internet browser, go to [email or web address]  2. Click on the First Time User? Click Here link in the Sign In box. You will be redirect to the New Member Sign Up page. 3. Enter your [facility] Access Code exactly as it appears below. You will not need to use this code after youve completed the sign-up process. If you do not sign up before the expiration date, you must request a new code. [facility] Access Code: [email or web address]  Expires: 2019  4:10 PM (This is the date your [facility] access code will )    4. Enter the last four digits of your Social Security Number (xxxx) and Date of Birth (mm/dd/yyyy) as indicated and click Submit. You will be taken to the next sign-up page. 5. Create a [facility] ID. This will be your [facility] login ID and cannot be changed, so think of one that is secure and easy to remember. 6. Create a [facility] password. You can change your password at any time. 7. Enter your Password Reset Question and Answer. This can be used at a later time if you forget your password. 8. Enter your e-mail address. You will receive e-mail notification when new information is available in [address] 9. Click Sign Up. You can now view and download portions of your medical record.   10. Click the Download Summary menu link to download a portable copy of your medical information. Additional Information    If you have questions, please visit the Frequently Asked Questions section of the [employer] website at [email or web address]. "[employer]. [email or web address] Remember, [email or web address] is NOT to be used for urgent needs. For medical emergencies, dial 911. The discharge information has been reviewed with the patient and spouse. The patient and spouse verbalized understanding. Discharge medications reviewed with the patient and spouse and appropriate educational materials and side effects teaching were provided.   ___________________________________________________________________________________________________________________________________

## 2019-10-25 NOTE — ROUTINE PROCESS
Assumed care of pt. Board updated. Assessment completed. No distress voiced/noted. Pain documented. Call bell within reach. Will continue to monitor.

## 2019-10-25 NOTE — PROGRESS NOTES
Progress Note    Patient: Denise Stack MRN: [id number]  SSN: [id number]    YOB: 1945  Age: 76 y.o. Sex: male      Admit Date: 10/24/2019    LOS: 0 days     Assessment:   Sp explant/implant of IPP    Plan:     Doing well. Dorsey out. Dc TLS Drains in afternoon. Home this afternoon after he voids and completes iv abx. Subjective:     No acute issues. Objective:     Vitals:    10/24/19 1646 10/24/19 1930 10/25/19 0050 10/25/19 0756   BP: 126/68 125/70 136/71 129/79   Pulse: 72 77 93 86   Resp: 18 18 17 16   Temp: 97.4 °F (36.3 °C) 98.4 °F (36.9 °C) 99.8 °F (37.7 °C) 99.3 °F (37.4 °C)   SpO2:  97% 95% 97%   Weight:       Height:            Intake and Output:  Current Shift: 10/25 0701 - 10/25 1900  In: 175 [P.O.:175]  Out: 0   Last three shifts: 10/23 1901 - 10/25 0700  In: 750 [P.O.:200;  I.V.:550]  Out: 1737 [Urine:1725; Drains:12]    Current Facility-Administered Medications   Medication Dose Route Frequency    tobramycin (NEBCIN) 330 mg in 0.9% sodium chloride 100 mL IVPB  330 mg IntraVENous Q24H    alfuzosin SR (UROXATRAL) tablet 10 mg  10 mg Oral DAILY    pantoprazole (PROTONIX) tablet 40 mg  40 mg Oral BID    simvastatin (ZOCOR) tablet 20 mg  20 mg Oral QHS    metoprolol succinate (TOPROL-XL) XL tablet 25 mg  25 mg Oral DAILY    sodium chloride (NS) flush 5-40 mL  5-40 mL IntraVENous Q8H    sodium chloride (NS) flush 5-40 mL  5-40 mL IntraVENous PRN    acetaminophen (TYLENOL) tablet 650 mg  650 mg Oral Q4H PRN    oxyCODONE IR (ROXICODONE) tablet 5 mg  5 mg Oral Q4H PRN    HYDROmorphone (DILAUDID) syringe 0.2 mg  0.2 mg IntraVENous Q4H PRN    ondansetron (ZOFRAN) injection 4 mg  4 mg IntraVENous Q4H PRN    insulin lispro (HUMALOG) injection   SubCUTAneous AC&HS    glucose chewable tablet 16 g  4 Tab Oral PRN    glucagon (GLUCAGEN) injection 1 mg  1 mg IntraMUSCular PRN    dextrose 10 % infusion 125-250 mL  125-250 mL IntraVENous PRN    naloxone (NARCAN) injection 0.4 mg  0.4 mg IntraVENous PRN    heparin (porcine) injection 5,000 Units  5,000 Units SubCUTAneous Q8H         Physical Exam:   GENERAL: alert, cooperative, no distress, appears stated age  LUNG: unlabored breathing  ABDOMEN: soft, non-tender. EXTREMITIES:  extremities normal, atraumatic, no cyanosis or edema  SKIN: no jaundice  : penis normal, implant in place. Testicles normal.   Mild blanching erythema in suprapubic area. tls x 2 w minimal output. Lab/Data Review:  BMP:   Lab Results   Component Value Date/Time     10/25/2019 05:40 AM    K 3.8 10/25/2019 05:40 AM     10/25/2019 05:40 AM    CO2 28 10/25/2019 05:40 AM    AGAP 9 10/25/2019 05:40 AM     (H) 10/25/2019 05:40 AM    BUN 18 10/25/2019 05:40 AM    CREA 1.12 10/25/2019 05:40 AM    GFRAA >60 10/25/2019 05:40 AM    GFRNA >60 10/25/2019 05:40 AM     CBC:   Lab Results   Component Value Date/Time    WBC 9.8 10/25/2019 05:40 AM    HGB 12.9 (L) 10/25/2019 05:40 AM    HCT 38.2 10/25/2019 05:40 AM     10/25/2019 05:40 AM     COAGS: No results found for: APTT, PTP, INR, INREXT, INREXT       CT Results:  Results from Orders Only encounter on 18   CT ABD PELV WO CONT    Impression Auth Prov: Pura Basilio  CC Prov:                 [address]  [facility] [address]  [phone]      Imaging Result     Name:    Kathy Wen ([id number])  Sex: Male :    68year old   Patient Class:   Outpatient Private Diagnosis:  Flank pain [R10.9 (ICD-10-CM)]  Kidney stone [N20.0 (ICD-10-CM)]               Procedures Performed: Exam Date/Time: Reason for Exam:  CT ABDOMEN/PELVIS W/O CONTRAST  [id number]  2018  1:05 PM   None Specified              EXAM: CT of the abdomen and pelvis     INDICATION: Flank pain.     COMPARISON: 10/02/13.     TECHNIQUE: Axial CT imaging of the abdomen and pelvis was performed without intravenous contrast. Evaluation of internal organs are limited due to lack of intravenous contrast.  Multiplanar reformats were generated.       One or more dose reduction techniques were used on this CT: automated exposure control, adjustment of the mAs and/or kVp according to patient size, and iterative reconstruction techniques. The specific techniques used on this CT exam have been documented in the patient's electronic medical record.     _______________     FINDINGS:        LOWER CHEST: Unremarkable.     LIVER, BILIARY: Liver is normal. No biliary dilation. Gallbladder is unremarkable.     PANCREAS: Normal.     SPLEEN: Normal.     ADRENALS: Normal.     KIDNEYS:   Left kidney 8mm hypodensity, too small to characterize. Bilateral stable renal stones. No ureteral stones. No hydronephrosis. No hydroureter. Urinary bladder is distended but there is prominence of the urinary bladder wall with slight adjacent stranding. No bladder stone.     LYMPH NODES: No enlarged lymph nodes.     GASTROINTESTINAL TRACT: No bowel dilation or wall thickening.       PELVIC ORGANS: Within normal limits.     VASCULATURE: Aorta has a normal caliber. No periaortic collections. Mild vascular atherosclerotic disease.     BONES: No acute or aggressive osseous abnormalities identified. Multilevel spinal degenerative changes. Prominent posterior endplate osteophytes with canal stenosis.        OTHER:   No intraperitoneal free air. No free or loculated fluid. Stable retroperitoneal lipomatosis. Left inguinal hernia with adipose tissue. Penile implant in place. Right sided anterior abdominal wall skin thickening.        _______________  IMPRESSION  IMPRESSION:     1.  Bilateral nonobstructing nephrolithiasis. 2.  Possible mild cystitis. 3.  No evidence for bowel obstruction. 4.  Mild right anterior abdominal wall skin thickening of uncertain etiology.   Clinical correlation is recommended.     Dictated by: Brittany Mistry on Fri 2018 11:24:00 AM EDT      Signed By:Brown, Sharyle Crock, MD   2018 11:35 AM                US Results:  Results from Abstract encounter on 18   US RETROPERITONEUM LTD    Impression   Auth Prov: Sulema Severino  [id number]:               [facility] Ultrasound  [address] [address]  [phone]      Imaging Result     Name:    Hilario Hernadez ([id number])  Sex: Male :    68year old   Patient Class: Outpatient Private Diagnosis:  Nephrolithiasis [N20.0 (ICD-10-CM)]               Procedures Performed: Exam Date/Time: Reason for Exam:  US RETROPERITONEAL LIMITED  [id number]  2018 10:17 AM   None Specified              EXAM: Ultrasound retroperitoneum.     INDICATION: Nephrolithiasis     COMPARISON: Right upper quadrant ultrasound on 16     _______________     FINDINGS:     Ultrasound was performed of the kidneys including color Doppler.     Right kidney measures 11.1 cm in length, left kidney measures 11.2 cm in length. The renal parenchymal mantles are somewhat thinned bilaterally, and echogenic, isoechoic with the liver. No definite echogenic foci within the kidneys to suggest renal calculi. No hydronephrosis. At the lower pole of the left kidney, a circumscribed anechoic 11 x 10 mm lesion is present without internal vascularity, with posterior acoustic enhancement compatible with a benign simple cyst.  Right kidney is unchanged correlated with the prior right upper quadrant ultrasound.     _______________  IMPRESSION  IMPRESSION:     1. No renal calculi. No hydronephrosis. 2.  Echogenic renal parenchyma and thin cortical mantles, suggesting medical renal disease.   3.  Lower pole left renal benign simple cyst.     Dictated by: Alessandra Kent on Fri May 25, 2018  4:42:12 PM EDT      Signed Lady Esparza MD   2018  4:45 PM                      [facility] [[id number]]    ~~This report was copied and pasted from the servicing EHR system. ~~           Active Problems:    Erectile dysfunction ()          Signed By: Dean Gates MD , FACS    October 25, 2019      PAGER:  [phone]  OFFICE:  [facility] [address]. [phone]

## 2019-10-25 NOTE — PROGRESS NOTES
conducted an initial consultation and Spiritual Assessment for Shanon Cueva, who is a 76 y.o.,male. Patients Primary Language is: [place]. According to the patients EMR Buddhist Affiliation is: Alevism.     The reason the Patient came to the hospital is:   Patient Active Problem List    Diagnosis Date Noted    Weak urinary stream     Serum cholesterol elevated     Plantar fasciitis     Obesity     Impotence of organic origin     [place] (hard of hearing)     History of penile implant     Hemorrhoid     Flank pain     Diabetic neuropathy ([address]     Diabetes ([address]     Colon polyps     CKD (chronic kidney disease), stage III ([address]     Bladder atony     Weak urine stream 06/12/2018    Rotator cuff tear 12/03/2015    Type 2 diabetes mellitus without complication ([address] 16/71/0007    Type II or unspecified type diabetes mellitus with renal manifestations, uncontrolled(250.42) ([address] 10/04/2013    Anemia 10/02/2013    Fall from other slipping, tripping, or stumbling 10/02/2013    Hepatic steatosis 08/27/5143    Metabolic acidosis 98/71/4727    Renal failure, acute ([address] 10/02/2013    GERD (gastroesophageal reflux disease)     Hypercholesteremia     Kidney stones     Lumbar disc disease     Hypertension     Diabetes mellitus ([address]     Erectile dysfunction         The  provided the following Interventions:  Initiated a relationship of care and support with patient in room 2201 this morning on day one post surgery. Listened empathically to patient as he shared his story of needing to get discharged quick so the he can get home to his wife who is alone and has Parkinsons . Patient had questions about the possibility of getting home health for her and if I knew how that worked. I made a referral to Discharge planner, with an explanation of his need. Patient says he feels ok today  Provided information about [address] [facility].   Offered prayer and assurance of continued prayers on patients behalf. The following outcomes were achieved:  Patient shared limited information about  his medical narrative and spiritual journey/beliefs. Patient expressed gratitude for pastoral care visit. Assessment:  Patient does not have any Rastafari/cultural needs that will affect patients preferences in health care. There are no further spiritual or Rastafari issues which require Spiritual Care Services interventions at this time. Plan:  Chaplains will continue to follow and will provide pastoral care on an as needed/requested basis    . Jonathan Eagle   Spiritual Care   [phone]

## 2019-10-25 NOTE — ROUTINE PROCESS
Bedside and Verbal shift change report given to Perez Orlando RN (oncoming nurse) by Carloz Green (offgoing nurse). Report included the following information SBAR, Kardex, Intake/Output and MAR.

## 2019-10-25 NOTE — PROGRESS NOTES
Problem: Discharge Planning  Goal: *Discharge to safe environment  Outcome: Resolved/Met   home    Reason for Admission:   Erectile dysfunction               RRAT Score:     29             Resources/supports as identified by patient/family:   none                Top Challenges facing patient (as identified by patient/family and CM): Finances/Medication cost?                    Transportation? Friend neel              Support system or lack thereof? Living arrangements? Lives with spouse           Self-care/ADLs/Cognition?  independent          Current Advanced Directive/Advance Care Plan:  none                          Plan for utilizing home health:    none                 Transition of Care Plan:   Spoke with pt, lives with spouse who has parkinsons. He was asking about help with her at  Home. Gave him brochures of agencies who could assist him. No services covered under his reg insurance. Also told him contact ss to see if can get senior services through them. He is independent with adls, uses walker out of home. Has 4 steps to enter 2 story home. Friend from Buddhist, Rose Marie Alemanke to transport home. Pcp,Dr Alex Choi, saw last wk. Demographics correct    Plan home. Refused to sign obs letters. Patient has designated ______spouse__________________ to participate in his/her discharge plan and to receive any needed information. Name: anabelle wilcox  Address:  Phone number: [phone]    Patient and/or next of kin has been given and has signed the [facility] Outpatient Observation  Notification letter and all questions answered. Copy of this notice given to patient and copy placed on chart. Patient and/or next of kin has been given the Outpatient Observation Information and Notification letter and all questions answered. Care Management Interventions  PCP Verified by CM:  Yes  Palliative Care Criteria Met (RRAT>21 & CHF Dx)?: No  Mode of Transport at Discharge:  Other (see comment)  Transition of Care Consult (CM Consult): Discharge Planning  Discharge Durable Medical Equipment: No  Physical Therapy Consult: No  Occupational Therapy Consult: No  Speech Therapy Consult: No  Current Support Network: Lives with Spouse  Confirm Follow Up Transport: Family  Plan discussed with Pt/Family/Caregiver: Yes  Discharge Location  Discharge Placement: Home

## 2019-10-25 NOTE — PROGRESS NOTES
0830  Assisted pt  To the chair for breakfast, urinal at bedside, due to void post soni, slight  Light bleeding around the  TLS, no pain, triflo encourage. 1030  Awaiting for discharge, no pain. Up to the recliner. 1200  Seen by Dr Jane Kelley, dressing around penis removed by MD, up  Walking in the room. For discharge today  Late. Due to void. 1450  Voided 250 cc, bladder scan 250 cc, paged Dr Jane Kelley.    12  Dr Jane Kelley called back, per MD give him more time, then call him again after void, TLS can be removed as order. 1600  Voided 200 cc, bladder scan 200cc, called OR leave message    1745   Voided 600 cc, bladder scan zero,seen by  Dr Jane Kelley for discharge today, aware of pt  Bruise around the penis.     1820  Discharge instructions given , verbalized understanding, IV removed no redness, pain under control, penis  Pinkish  At perineum    1900  Discharge home with wife, accompanied y KAMILA Patel

## 2019-10-28 LAB
BACTERIA SPEC CULT: ABNORMAL
GRAM STN SPEC: ABNORMAL
GRAM STN SPEC: ABNORMAL
SERVICE CMNT-IMP: ABNORMAL

## 2019-10-28 NOTE — PROGRESS NOTES
I called him to let them know an antibiotic was sent to his pharmacy in exchange for Bactrim which not only does not cover both germs in recent culture, but also may be contributing to his lack of appetite. I spoke to his wife who thanked me and stated that she will  new medication tomorrow AM. Please call them around noon to be sure he gets and starts the Augmentin.   Thanks,  Gallo Piña

## 2019-10-29 LAB
BACTERIA SPEC ANAEROBE CULT: NORMAL
SPECIMEN SOURCE: NORMAL

## 2019-11-08 LAB
BACTERIA SPEC CULT: NORMAL
SOURCE, RSRC56: NORMAL

## 2021-08-03 PROBLEM — N52.9 ERECTILE DYSFUNCTION: Status: RESOLVED | Noted: 2021-08-03 | Resolved: 2021-08-03

## 2022-03-19 PROBLEM — R39.12 WEAK URINE STREAM: Status: ACTIVE | Noted: 2018-06-12

## 2024-07-09 ENCOUNTER — HOSPITAL ENCOUNTER (OUTPATIENT)
Facility: HOSPITAL | Age: 79
Setting detail: RECURRING SERIES
Discharge: HOME | End: 2024-07-12
Payer: MEDICARE

## 2024-07-09 PROCEDURE — 97162 PT EVAL MOD COMPLEX 30 MIN: CPT

## 2024-07-09 PROCEDURE — 97163 PT EVAL HIGH COMPLEX 45 MIN: CPT

## 2024-07-09 NOTE — PROGRESS NOTES
PHYSICAL / OCCUPATIONAL THERAPY - DAILY TREATMENT NOTE    Patient Name: Kain Lynch    Date: 2024    : 1945  Insurance: Payor: MEDICARE / Plan: MEDICARE PART A AND B / Product Type: *No Product type* /      Patient  verified Yes     Visit #   Current / Total 1 6-8   Time   In / Out 11:00 11:45   Pain   In / Out 2/10 2/10   Subjective Functional Status/Changes: See eval.     TREATMENT AREA =  Pain in right shoulder [M25.511]     OBJECTIVE    45 min [x]Eval - untimed                      Therapeutic Procedures:    Tx Min Billable or 1:1 Min (if diff from Tx Min) Procedure, Rationale, Specifics          Details if applicable:              Details if applicable:            Details if applicable:            Details if applicable:            Details if applicable:     0 0 MC BC Totals Reminder: bill using total billable min of TIMED therapeutic procedures (example: do not include dry needle or estim unattended, both untimed codes, in totals to left)  8-22 min = 1 unit; 23-37 min = 2 units; 38-52 min = 3 units; 53-67 min = 4 units; 68-82 min = 5 units   Total Total     [x]  Patient Education billed concurrently with other procedures   [x] Review HEP    [] Progressed/Changed HEP, detail:    [] Other detail:       Objective Information/Functional Measures/Assessment    See eval.    Patient will continue to benefit from skilled PT / OT services to modify and progress therapeutic interventions, analyze and address functional mobility deficits, analyze and address ROM deficits, analyze and address strength deficits, analyze and address soft tissue restrictions, analyze and cue for proper movement patterns, analyze and modify for postural abnormalities, and instruct in home and community integration to address functional deficits and attain remaining goals.    Progress toward goals / Updated goals:  []  See Progress Note/Recertification    Short Term Goals: To be accomplished in 3 weeks  Patient Independent

## 2024-07-09 NOTE — THERAPY EVALUATION
ability to lift heavy objects without right shoulder pain in order to lift dog food.   Status at IE:  Right shoulder pain with lifting heavy objects.    Frequency / Duration: Patient to be seen 1 times per week for 6-8 WEEKS    Patient/ Caregiver education and instruction: Diagnosis, prognosis, self care, activity modification, and exercises [x]  Plan of care has been reviewed with PTA    Certification Period: 07/09/2024 - 09/05/2024    Abhay Griffiths, MPT     07/09/2024     6:22 PM  Liset Hung, SPT       7/9/2024       10:37 AM    Payor: MEDICARE / Plan: MEDICARE PART A AND B / Product Type: *No Product type* /     Physician signature required for Medicare, Medicaid, Worker's Comp, Direct Access   ===================================================================  I certify that the above Therapy Services are being furnished while the patient is under my care. I agree with the treatment plan and certify that this therapy is necessary.    Physician's Signature:_________________________   DATE:_________   TIME:________                           Julio Kelly, APRN - *    ** Signature, Date and Time must be completed for valid certification **  Please sign and fax to [facility]. Thank you

## 2024-07-16 ENCOUNTER — HOSPITAL ENCOUNTER (OUTPATIENT)
Facility: HOSPITAL | Age: 79
Setting detail: RECURRING SERIES
Discharge: HOME | End: 2024-07-19
Payer: MEDICARE

## 2024-07-16 PROCEDURE — 97110 THERAPEUTIC EXERCISES: CPT

## 2024-07-16 NOTE — PROGRESS NOTES
PHYSICAL / OCCUPATIONAL THERAPY - DAILY TREATMENT NOTE    Patient Name: Kain Lynch    Date: 2024    : 1945  Insurance: Payor: MEDICARE / Plan: MEDICARE PART A AND B / Product Type: *No Product type* /      Patient  verified Yes     Visit #   Current / Total 2 6-8   Time   In / Out 10:13 10:56   Pain   In / Out 2/10 2/10   Subjective Functional Status/Changes: Pt has been using a yard work tool to exercise his shoulder that he said makes it feel good/stronger.     TREATMENT AREA =  Pain in right shoulder [M25.511]     OBJECTIVE    Therapeutic Procedures:    Tx Min Billable or 1:1 Min (if diff from Tx Min) Procedure, Rationale, Specifics   43 [phone] [id number] Therapeutic Exercise (timed):  increase ROM, strength, coordination, balance, and proprioception to improve patient's ability to progress to PLOF and address remaining functional goals. (see flow sheet as applicable)     Details if applicable:            Details if applicable:            Details if applicable:            Details if applicable:            Details if applicable:     43 43 [facility] Totals Reminder: bill using total billable min of TIMED therapeutic procedures (example: do not include dry needle or estim unattended, both untimed codes, in totals to left)  8-22 min = 1 unit; 23-37 min = 2 units; 38-52 min = 3 units; 53-67 min = 4 units; 68-82 min = 5 units   Total Total     [x]  Patient Education billed concurrently with other procedures   [x] Review HEP    [] Progressed/Changed HEP, detail:    [] Other detail:       Objective Information/Functional Measures/Assessment    Needed manual and tactile cueing for banded ER to prevent elbow extension. Pt requires reminders to not push himself too hard. Given exercises and OTB for HEP. Pt reported right arm fatigue at end of session but no increase in pain.    Patient will continue to benefit from skilled PT / OT services to modify and progress therapeutic interventions, analyze and address

## 2024-07-25 ENCOUNTER — HOSPITAL ENCOUNTER (OUTPATIENT)
Facility: HOSPITAL | Age: 79
Setting detail: RECURRING SERIES
Discharge: HOME | End: 2024-07-28
Payer: MEDICARE

## 2024-07-25 PROCEDURE — 97110 THERAPEUTIC EXERCISES: CPT

## 2024-07-25 NOTE — PROGRESS NOTES
PHYSICAL / OCCUPATIONAL THERAPY - DAILY TREATMENT NOTE    Patient Name: Kain Lynch    Date: 2024    : 1945  Insurance: Payor: MEDICARE / Plan: MEDICARE PART A AND B / Product Type: *No Product type* /      Patient  verified Yes     Visit #   Current / Total 3 6-8   Time   In / Out 10:45 11:32   Pain   In / Out 2/10 2/10   Subjective Functional Status/Changes: Pt reports still some limitation for reaching back/behind back, but he has continued to work on it at home.     TREATMENT AREA =  Pain in right shoulder [M25.511]     OBJECTIVE    Therapeutic Procedures:    Tx Min Billable or 1:1 Min (if diff from Tx Min) Procedure, Rationale, Specifics   47 [phone] [id number] Therapeutic Exercise (timed):  increase ROM, strength, coordination, balance, and proprioception to improve patient's ability to progress to PLOF and address remaining functional goals. (see flow sheet as applicable)     Details if applicable:            Details if applicable:            Details if applicable:            Details if applicable:            Details if applicable:     47 47 [facility] Totals Reminder: bill using total billable min of TIMED therapeutic procedures (example: do not include dry needle or estim unattended, both untimed codes, in totals to left)  8-22 min = 1 unit; 23-37 min = 2 units; 38-52 min = 3 units; 53-67 min = 4 units; 68-82 min = 5 units   Total Total     [x]  Patient Education billed concurrently with other procedures   [x] Review HEP    [] Progressed/Changed HEP, detail:    [] Other detail:       Objective Information/Functional Measures/Assessment    Cuing for form/sequence with many exercises.  Good PROM for right shldr ER @90 Abduction with wand in supine, but IR somewhat limited.  Behind back reach with strap somewhat improved, but still limited and with discomfort.  Sleeper stretch added and limited motion with discomfort.     Patient will continue to benefit from skilled PT / OT services to modify and

## 2024-07-31 ENCOUNTER — APPOINTMENT (OUTPATIENT)
Facility: HOSPITAL | Age: 79
End: 2024-07-31
Payer: MEDICARE

## 2024-08-06 ENCOUNTER — HOSPITAL ENCOUNTER (OUTPATIENT)
Facility: HOSPITAL | Age: 79
Setting detail: RECURRING SERIES
Discharge: HOME | End: 2024-08-09
Payer: MEDICARE

## 2024-08-06 PROCEDURE — 97530 THERAPEUTIC ACTIVITIES: CPT

## 2024-08-06 PROCEDURE — 97110 THERAPEUTIC EXERCISES: CPT

## 2024-08-06 NOTE — PROGRESS NOTES
PHYSICAL / OCCUPATIONAL THERAPY - DAILY TREATMENT NOTE    Patient Name: Kain Lynch    Date: 2024    : 1945  Insurance: Payor: MEDICARE / Plan: MEDICARE PART A AND B / Product Type: *No Product type* /      Patient  verified Yes     Visit #   Current / Total 4 6-8   Time   In / Out 10:17 1:00   Pain   In / Out 1.5/10 1.510   Subjective Functional Status/Changes: Pt relates right deltoid area feeling tight--describes as discomfort, not pain.  Pt reports being able to quickly draw pistol from right hip holster quickly now with only mild discomfort.  He has been doing HEP.     TREATMENT AREA =  Pain in right shoulder [M25.511]     OBJECTIVE    Therapeutic Procedures:    Tx Min Billable or 1:1 Min (if diff from Tx Min) Procedure, Rationale, Specifics   28 28 [id number] Therapeutic Exercise (timed):  increase ROM, strength, coordination, balance, and proprioception to improve patient's ability to progress to PLOF and address remaining functional goals. (see flow sheet as applicable)     Details if applicable:  Includes Reassessment.   15 15 04275 Therapeutic Activity (timed):  use of dynamic activities replicating functional movements to increase ROM, strength, coordination, balance, and proprioception in order to improve patient's ability to progress to PLOF and address remaining functional goals.  (see flow sheet as applicable)     Details if applicable:  Includes GROC and QuickDASH.   0 0 06320 Neuromuscular Re-Education (timed):  improve balance, coordination, kinesthetic sense, posture, core stability and proprioception to improve patient's ability to develop conscious control of individual muscles and awareness of position of extremities in order to progress to PLOF and address remaining functional goals. (see flow sheet as applicable)     Details if applicable:            Details if applicable:            Details if applicable:     43 43 [facility] Totals Reminder: bill using total billable min of TIMED

## 2024-08-06 NOTE — THERAPY RECERTIFICATION
[facility]  [address], [address], [place] [address] Ph: [phone] Fx: [phone]  PHYSICAL THERAPY PROGRESS NOTE  Patient Name: Kain Lynch : 1945   Treatment/Medical Diagnosis: Pain in right shoulder [M25.511]   Referral Source: Julio Kelly APRN - *     Date of Initial Visit: 2024 Attended Visits: 4 Missed Visits: 0     SUMMARY OF TREATMENT:    Patient seen in PT for Initial Evaluation 24 and for 3 additional follow-up treatment visits, 24 to 24.  PT treatment sessions consisted of Therapeutic Exercise, Therapeutic Activity, Neuromuscular Re-Education, HEP/Self-Care Instruction with handouts.    CURRENT STATUS:    Functional Gains: able to draw pistol from right hip holster now, but a little sore.  Able to drive riding mower, but use left hand to help right some to turn wheel.  Functional Deficits: some discomfort to change positions in bed at night; discomfort to carry dog down the stairs.  % improvement/GROC: +6, a great deal better  Pain   Average: 1/10                  Best: 0/10                Worst: 2/10  Patient Goal: \"carry dog, steer riding mower without shoulder pain\"      Right Shoulder AROM (standing):  Flexion 140 deg and \"stiff\" near/at end range; ABDuction/Scaption 160 deg and \"tight\" near/at end range; Extension 68 deg \"stiff, tight at end range; Behind Head Reach finger tips to ~T3 and > left by ~1/8\"; Behind Back Reach thumb tips to ~L3 bilat with discomfort bilat; Cross Body Reach finger tips just past left posterior deltoid with discomfort and left finger tips to right lateral infraspinatus tendon/muscle and OK.  Right Shoulder PROM (supine):  Flexion 150 deg and mild end range discomfort; ER (@90 scaption) ~80 deg with some end range discomfort; IR (@90 scaption) ~45 deg with some end range discomfort.  Right Shoulder MMT (mid range isometric hold, standing):  Flexion 4+/5; ABDuction

## 2024-08-13 ENCOUNTER — HOSPITAL ENCOUNTER (OUTPATIENT)
Facility: HOSPITAL | Age: 79
Setting detail: RECURRING SERIES
Discharge: HOME | End: 2024-08-16
Payer: MEDICARE

## 2024-08-13 PROCEDURE — 97110 THERAPEUTIC EXERCISES: CPT

## 2024-08-13 NOTE — PROGRESS NOTES
PHYSICAL / OCCUPATIONAL THERAPY - DAILY TREATMENT NOTE    Patient Name: Kain Lynch    Date: 2024    : 1945  Insurance: Payor: MEDICARE / Plan: MEDICARE PART A AND B / Product Type: *No Product type* /      Patient  verified Yes     Visit #   Current / Total 5 6-8   Time   In / Out 11:40 12:19   Pain   In / Out 2.5/10 10   Subjective Functional Status/Changes: Pt reports some right shoulder ache currently.     TREATMENT AREA =  Pain in right shoulder [M25.511]     OBJECTIVE    Therapeutic Procedures:    Tx Min Billable or 1:1 Min (if diff from Tx Min) Procedure, Rationale, Specifics   39 39 [phone] Therapeutic Exercise (timed):  increase ROM, strength, coordination, balance, and proprioception to improve patient's ability to progress to PLOF and address remaining functional goals. (see flow sheet as applicable)     Details if applicable:     0 0 20388 Therapeutic Activity (timed):  use of dynamic activities replicating functional movements to increase ROM, strength, coordination, balance, and proprioception in order to improve patient's ability to progress to PLOF and address remaining functional goals.  (see flow sheet as applicable)     Details if applicable:     0 0 35492 Neuromuscular Re-Education (timed):  improve balance, coordination, kinesthetic sense, posture, core stability and proprioception to improve patient's ability to develop conscious control of individual muscles and awareness of position of extremities in order to progress to PLOF and address remaining functional goals. (see flow sheet as applicable)     Details if applicable:            Details if applicable:            Details if applicable:     39 39 [facility] Totals Reminder: bill using total billable min of TIMED therapeutic procedures (example: do not include dry needle or estim unattended, both untimed codes, in totals to left)  8-22 min = 1 unit; 23-37 min = 2 units; 38-52 min = 3 units; 53-67 min = 4 units; 68-82 min = 5

## 2024-08-15 ENCOUNTER — APPOINTMENT (OUTPATIENT)
Facility: HOSPITAL | Age: 79
End: 2024-08-15
Payer: MEDICARE

## 2024-08-20 ENCOUNTER — HOSPITAL ENCOUNTER (OUTPATIENT)
Facility: HOSPITAL | Age: 79
Setting detail: RECURRING SERIES
Discharge: HOME | End: 2024-08-23
Payer: MEDICARE

## 2024-08-20 PROCEDURE — 97110 THERAPEUTIC EXERCISES: CPT

## 2024-08-20 NOTE — PROGRESS NOTES
PHYSICAL / OCCUPATIONAL THERAPY - DAILY TREATMENT NOTE    Patient Name: Kain Lynch    Date: 2024    : 1945  Insurance: Payor: MEDICARE / Plan: MEDICARE PART A AND B / Product Type: *No Product type* /      Patient  verified Yes     Visit #   Current / Total 6 6-8   Time   In / Out 10:28 11:08   Pain   In / Out 0/10 0/10   Subjective Functional Status/Changes: Pt reports that his firearm drawing with right hand is getting better.  Pt was doing some work around his home and lifting/carrying bricks and he used that for shoulder exercise.  OK after last PT session.     TREATMENT AREA =  Pain in right shoulder [M25.511]     OBJECTIVE    Therapeutic Procedures:    Tx Min Billable or 1:1 Min (if diff from Tx Min) Procedure, Rationale, Specifics   40 40 [id number] Therapeutic Exercise (timed):  increase ROM, strength, coordination, balance, and proprioception to improve patient's ability to progress to PLOF and address remaining functional goals. (see flow sheet as applicable)     Details if applicable:       0 0 04763 Therapeutic Activity (timed):  use of dynamic activities replicating functional movements to increase ROM, strength, coordination, balance, and proprioception in order to improve patient's ability to progress to PLOF and address remaining functional goals.  (see flow sheet as applicable)     Details if applicable:     0 0 84407 Neuromuscular Re-Education (timed):  improve balance, coordination, kinesthetic sense, posture, core stability and proprioception to improve patient's ability to develop conscious control of individual muscles and awareness of position of extremities in order to progress to PLOF and address remaining functional goals. (see flow sheet as applicable)     Details if applicable:            Details if applicable:            Details if applicable:     40 40 [facility] Totals Reminder: bill using total billable min of TIMED therapeutic procedures (example: do not include dry

## 2024-08-29 ENCOUNTER — HOSPITAL ENCOUNTER (OUTPATIENT)
Facility: HOSPITAL | Age: 79
Setting detail: RECURRING SERIES
End: 2024-08-29
Payer: MEDICARE

## 2024-08-29 PROCEDURE — 97530 THERAPEUTIC ACTIVITIES: CPT

## 2024-08-29 PROCEDURE — 97110 THERAPEUTIC EXERCISES: CPT

## 2024-08-29 NOTE — PROGRESS NOTES
PHYSICAL / OCCUPATIONAL THERAPY - DAILY TREATMENT NOTE    Patient Name: Kain Lynch    Date: 2024    : 1945  Insurance: Payor: MEDICARE / Plan: MEDICARE PART A AND B / Product Type: *No Product type* /      Patient  verified Yes     Visit #   Current / Total 7 6-8   Time   In / Out 10:25 11:05   Pain   In / Out 0/10 0/10   Subjective Functional Status/Changes: Pt denies right shoulder pain, but does state some \"soreness\".  Pt reports able to do normal activities well at home since last PT session--no specific c/o.     TREATMENT AREA =  Pain in right shoulder [M25.511]     OBJECTIVE    Therapeutic Procedures:    Tx Min Billable or 1:1 Min (if diff from Tx Min) Procedure, Rationale, Specifics   30 30 51270 Therapeutic Exercise (timed):  increase ROM, strength, coordination, balance, and proprioception to improve patient's ability to progress to PLOF and address remaining functional goals. (see flow sheet as applicable)     Details if applicable:     15 15 76416 Therapeutic Activity (timed):  use of dynamic activities replicating functional movements to increase ROM, strength, coordination, balance, and proprioception in order to improve patient's ability to progress to PLOF and address remaining functional goals.  (see flow sheet as applicable)     Details if applicable:     0 0 08631 Neuromuscular Re-Education (timed):  improve balance, coordination, kinesthetic sense, posture, core stability and proprioception to improve patient's ability to develop conscious control of individual muscles and awareness of position of extremities in order to progress to PLOF and address remaining functional goals. (see flow sheet as applicable)     Details if applicable:            Details if applicable:            Details if applicable:     45 45 [facility] Totals Reminder: bill using total billable min of TIMED therapeutic procedures (example: do not include dry needle or estim unattended, both untimed codes, in

## 2024-09-05 ENCOUNTER — HOSPITAL ENCOUNTER (OUTPATIENT)
Facility: HOSPITAL | Age: 79
Setting detail: RECURRING SERIES
Discharge: HOME | End: 2024-09-08
Payer: MEDICARE

## 2024-09-05 PROCEDURE — 97110 THERAPEUTIC EXERCISES: CPT

## 2024-09-05 PROCEDURE — 97530 THERAPEUTIC ACTIVITIES: CPT

## (undated) DEVICE — STERILE POLYISOPRENE POWDER-FREE SURGICAL GLOVES: Brand: PROTEXIS

## (undated) DEVICE — SUTURE NONABSORBABLE SILK BRAID BLK PSL 2 0 30IN 486H

## (undated) DEVICE — AGENT HEMSTAT W1XL2IN ABSRB SFT LTWT LAYERED ORC FIBRILLAR

## (undated) DEVICE — SYR 10ML CTRL LR LCK NSAF LF --

## (undated) DEVICE — TUBING IRRIG L77IN DIA0.241IN L BOR FOR CYSTO W/ NVENT

## (undated) DEVICE — Device

## (undated) DEVICE — COVER LT HNDL BLU PLAS

## (undated) DEVICE — SYR 50ML LR LCK 1ML GRAD NSAF --

## (undated) DEVICE — SUTURE PDS II SZ 3-0 L27IN ABSRB VLT RB-1 L17MM 1/2 CIR Z305H

## (undated) DEVICE — SYRINGE,TOOMEY,IRRIGATION,70CC,STERILE: Brand: MEDLINE

## (undated) DEVICE — PLUG CATH CAP URETH FOL STRL --

## (undated) DEVICE — SUTURE PDS II SZ 2-0 L27IN ABSRB VLT L26MM CT-2 1/2 CIR Z333H

## (undated) DEVICE — SKIN MARKER,REGULAR TIP WITH RULER AND LABELS: Brand: DEVON

## (undated) DEVICE — SUTURE VCRL SZ 3-0 L27IN ABSRB UD L26MM SH 1/2 CIR J416H

## (undated) DEVICE — NDL PRT INJ NSAF BLNT 18GX1.5 --

## (undated) DEVICE — REM POLYHESIVE ADULT PATIENT RETURN ELECTRODE: Brand: VALLEYLAB

## (undated) DEVICE — SOL INJ SOD CL 0.9% 250ML BG --

## (undated) DEVICE — 10 FRENCH DRAIN SYSTEM, STERILE: Brand: TLS

## (undated) DEVICE — CATH URETH INTMIT ROB 16FR FUN -- CONVERT TO ITEM 179520

## (undated) DEVICE — TRAY CATH 16F URIN MTR LTX -- CONVERT TO ITEM 363111

## (undated) DEVICE — BANDAGE,GAUZE,BULKEE II,4.5"X4.1YD,STRL: Brand: MEDLINE

## (undated) DEVICE — DEPAUL MAJOR PROCEDURE PACK: Brand: MEDLINE INDUSTRIES, INC.

## (undated) DEVICE — TUBE PORT-A-CUL SWAB 11ML ST

## (undated) DEVICE — TOWEL SURG W16XL26IN BLU NONFENESTRATED DLX ST 2 PER PK

## (undated) DEVICE — DERMABOND SKIN ADH 0.7ML -- DERMABOND ADVANCED 12/BX

## (undated) DEVICE — RAZOR PREP DBL EDGE STRL DISP --

## (undated) DEVICE — X-RAY SPONGES,12 PLY: Brand: DERMACEA

## (undated) DEVICE — INFLATABLE PENILE PROSTHESIS WITH MS PUMP ACCESSORY KIT: Brand: AMS 700 ACCESSORY KIT

## (undated) DEVICE — TABLE COVER: Brand: CONVERTORS

## (undated) DEVICE — SUTURE VCRL SZ 2-0 L27IN ABSRB UD L26MM SH 1/2 CIR J417H

## (undated) DEVICE — BANDAGE COMPR W4INXL5YD BGE COHESIVE SELF ADH ADBAN CBN1104] AVCOR HEALTHCARE PRODUCTS INC]

## (undated) DEVICE — SYRINGE MED 3ML NDL 22GA L1 1/2IN REG BVL SFGLDE

## (undated) DEVICE — SYRINGE MED 3ML NDL 22GA L1.5IN PLAS N CTRL LUERLOCK TIP

## (undated) DEVICE — HEX-LOCKING BLADE ELECTRODE: Brand: EDGE

## (undated) DEVICE — MEDI-VAC SUCTION HANDLE REGULAR CAPACITY: Brand: CARDINAL HEALTH

## (undated) DEVICE — (D)PREP SKN CHLRAPRP APPL 26ML -- CONVERT TO ITEM 371833